# Patient Record
Sex: MALE | Race: BLACK OR AFRICAN AMERICAN | Employment: FULL TIME | URBAN - METROPOLITAN AREA
[De-identification: names, ages, dates, MRNs, and addresses within clinical notes are randomized per-mention and may not be internally consistent; named-entity substitution may affect disease eponyms.]

---

## 2022-01-14 ENCOUNTER — HOSPITAL ENCOUNTER (OUTPATIENT)
Age: 62
Discharge: HOME | End: 2022-01-14
Attending: EMERGENCY MEDICINE
Payer: COMMERCIAL

## 2022-01-14 VITALS
WEIGHT: 286 LBS | TEMPERATURE: 98 F | HEART RATE: 87 BPM | BODY MASS INDEX: 42.36 KG/M2 | DIASTOLIC BLOOD PRESSURE: 84 MMHG | SYSTOLIC BLOOD PRESSURE: 185 MMHG | HEIGHT: 69 IN | RESPIRATION RATE: 20 BRPM | OXYGEN SATURATION: 99 %

## 2022-01-14 DIAGNOSIS — S76.012A STRAIN OF LEFT BUTTOCK, INITIAL ENCOUNTER: Primary | ICD-10-CM

## 2022-01-14 PROCEDURE — 99202 OFFICE O/P NEW SF 15 MIN: CPT

## 2022-01-15 NOTE — ED INITIAL ASSESSMENT (HPI)
SLIPPED ON ICE 1 WEEK AGO. FELL ONTI LEFT BUTTOCKS.   CO intermittent pain to that area with intermittent radiation down left leg.  sts pain improves when he walks around but worsens if he sits to long

## 2022-01-15 NOTE — ED PROVIDER NOTES
Patient Seen in: [facility]      History   Patient presents with:  Fall    Stated Complaint: left side pain , pt fell on sunday    Subjective:   HPI    69-year-old male who slipped on the ice 5 days ago and landed on concrete.   He initial Disposition:  Discharge  1/14/2022  6:24 pm    Follow-up:  John Dixon, [id number] [facility]  [phone]    In 1 week  As needed          Medications Prescribed:  Current Discharge Medication List